# Patient Record
Sex: MALE
[De-identification: names, ages, dates, MRNs, and addresses within clinical notes are randomized per-mention and may not be internally consistent; named-entity substitution may affect disease eponyms.]

---

## 2018-05-09 ENCOUNTER — CHARTING TRANS (OUTPATIENT)
Dept: OTHER | Age: 23
End: 2018-05-09

## 2018-11-01 VITALS
HEIGHT: 63 IN | HEART RATE: 84 BPM | OXYGEN SATURATION: 97 % | SYSTOLIC BLOOD PRESSURE: 108 MMHG | RESPIRATION RATE: 16 BRPM | DIASTOLIC BLOOD PRESSURE: 70 MMHG | WEIGHT: 164.99 LBS | TEMPERATURE: 98.3 F | BODY MASS INDEX: 29.23 KG/M2

## 2021-04-27 ENCOUNTER — OFFICE VISIT (OUTPATIENT)
Dept: FAMILY MEDICINE CLINIC | Facility: CLINIC | Age: 26
End: 2021-04-27

## 2021-04-27 VITALS
HEIGHT: 63 IN | DIASTOLIC BLOOD PRESSURE: 88 MMHG | OXYGEN SATURATION: 98 % | HEART RATE: 80 BPM | BODY MASS INDEX: 31.18 KG/M2 | WEIGHT: 176 LBS | SYSTOLIC BLOOD PRESSURE: 120 MMHG

## 2021-04-27 DIAGNOSIS — S52.125A CLOSED NONDISPLACED FRACTURE OF HEAD OF LEFT RADIUS, INITIAL ENCOUNTER: Primary | ICD-10-CM

## 2021-04-27 PROCEDURE — 3079F DIAST BP 80-89 MM HG: CPT | Performed by: FAMILY MEDICINE

## 2021-04-27 PROCEDURE — 3008F BODY MASS INDEX DOCD: CPT | Performed by: FAMILY MEDICINE

## 2021-04-27 PROCEDURE — 3074F SYST BP LT 130 MM HG: CPT | Performed by: FAMILY MEDICINE

## 2021-04-27 PROCEDURE — 99203 OFFICE O/P NEW LOW 30 MIN: CPT | Performed by: FAMILY MEDICINE
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
## 2021-05-18 ENCOUNTER — HOSPITAL ENCOUNTER (OUTPATIENT)
Dept: GENERAL RADIOLOGY | Facility: HOSPITAL | Age: 26
Discharge: HOME | End: 2021-05-18
Attending: ORTHOPAEDIC SURGERY
Payer: COMMERCIAL

## 2021-05-18 ENCOUNTER — OFFICE VISIT (OUTPATIENT)
Dept: ORTHOPEDICS CLINIC | Facility: CLINIC | Age: 26
End: 2021-05-18

## 2021-05-18 VITALS — WEIGHT: 176 LBS | BODY MASS INDEX: 31.18 KG/M2 | HEIGHT: 63 IN

## 2021-05-18 DIAGNOSIS — S59.902A INJURY OF LEFT ELBOW, INITIAL ENCOUNTER: Primary | ICD-10-CM

## 2021-05-18 DIAGNOSIS — S52.132D CLOSED DISPLACED FRACTURE OF NECK OF LEFT RADIUS WITH ROUTINE HEALING, SUBSEQUENT ENCOUNTER: ICD-10-CM

## 2021-05-18 DIAGNOSIS — S59.902A INJURY OF LEFT ELBOW, INITIAL ENCOUNTER: ICD-10-CM

## 2021-05-18 PROCEDURE — 24650 CLTX RDL HEAD/NCK FX WO MNPJ: CPT | Performed by: ORTHOPAEDIC SURGERY

## 2021-05-18 PROCEDURE — 3008F BODY MASS INDEX DOCD: CPT | Performed by: ORTHOPAEDIC SURGERY

## 2021-05-18 PROCEDURE — 99244 OFF/OP CNSLTJ NEW/EST MOD 40: CPT | Performed by: ORTHOPAEDIC SURGERY

## 2021-05-18 PROCEDURE — 73080 X-RAY EXAM OF ELBOW: CPT | Performed by: ORTHOPAEDIC SURGERY
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
## 2021-05-27 ENCOUNTER — TELEPHONE (OUTPATIENT)
Dept: PHYSICAL THERAPY | Facility: HOSPITAL | Age: 26
End: 2021-05-27

## 2021-06-17 ENCOUNTER — OFFICE VISIT (OUTPATIENT)
Dept: PHYSICAL THERAPY | Facility: HOSPITAL | Age: 26
End: 2021-06-17
Attending: ORTHOPAEDIC SURGERY
Payer: COMMERCIAL

## 2021-06-17 DIAGNOSIS — S52.132D CLOSED DISPLACED FRACTURE OF NECK OF LEFT RADIUS WITH ROUTINE HEALING, SUBSEQUENT ENCOUNTER: ICD-10-CM

## 2021-06-17 PROCEDURE — 97110 THERAPEUTIC EXERCISES: CPT

## 2021-06-17 PROCEDURE — 97161 PT EVAL LOW COMPLEX 20 MIN: CPT

## 2021-06-17 NOTE — PROGRESS NOTES
SHOULDER EVALUATION:   Referring Physician: Dr. Ward [id number]  Diagnosis: Closed displaced fracture of neck of left radius with routine healing, subsequent encounter (G76.580R)     Date of Service: 6/17/2021     PATIENT SUMMARY   Kate Grigsby is a 22 yea L shoulder,  elbow, wrist, and hand with difficulty gripping.  The results of the objective tests and measures show pt exhibits postural deviations, as well as decreased ROM, flexibility, and strength of the L shoulder, elbow, forearm, and wrist.  Pt would Extension: R +5; L 0 end range strain   Pronation: R 80; L 80  Supination: R 75; L 70 end range strain    Flexion: R 60; L 50  Extension: R 55; L 50  Radial dev: R 30;  L 25  Ulnar dev: R 45; L 45       Strength/MMT:   Shoulder Elbow Wrist    Flexion: R 5 muscle grade for functional ADL of lifting, carrying, and doing chores. Pt will be Indep with HEP for symptom management and recovery of function. Frequency / Duration: Patient will be seen for 2 x/week or a total of 8-10 visits over a 90 day period.

## 2021-06-23 ENCOUNTER — OFFICE VISIT (OUTPATIENT)
Dept: PHYSICAL THERAPY | Facility: HOSPITAL | Age: 26
End: 2021-06-23
Attending: ORTHOPAEDIC SURGERY
Payer: COMMERCIAL

## 2021-06-23 DIAGNOSIS — S52.132D CLOSED DISPLACED FRACTURE OF NECK OF LEFT RADIUS WITH ROUTINE HEALING, SUBSEQUENT ENCOUNTER: ICD-10-CM

## 2021-06-23 PROCEDURE — 97140 MANUAL THERAPY 1/> REGIONS: CPT

## 2021-06-23 PROCEDURE — 97110 THERAPEUTIC EXERCISES: CPT

## 2021-06-23 NOTE — PROGRESS NOTES
Dx: Closed displaced fracture of neck of left radius with routine healing, subsequent encounter [employer] (Authorized # of Visits):   HMO: 12 by 8/24            Authorizing Physician: Dr. Satinder Cardenas MD visit: uncertain   Fall Risk: and doing chores. Pt will be Indep with HEP for symptom management and recovery of function.       Plan:  Plan to continue skilled PT to  to address jt restrictions, restore ROM, and progress with strengthening to achieve goals as outlined and to promote

## 2021-06-28 ENCOUNTER — OFFICE VISIT (OUTPATIENT)
Dept: PHYSICAL THERAPY | Facility: HOSPITAL | Age: 26
End: 2021-06-28
Attending: ORTHOPAEDIC SURGERY
Payer: COMMERCIAL

## 2021-06-28 DIAGNOSIS — S52.132D CLOSED DISPLACED FRACTURE OF NECK OF LEFT RADIUS WITH ROUTINE HEALING, SUBSEQUENT ENCOUNTER: ICD-10-CM

## 2021-06-28 PROCEDURE — 97110 THERAPEUTIC EXERCISES: CPT

## 2021-06-28 PROCEDURE — 97140 MANUAL THERAPY 1/> REGIONS: CPT

## 2021-06-28 NOTE — PROGRESS NOTES
Dx: Closed displaced fracture of neck of left radius with routine healing, subsequent encounter [employer] (Authorized # of Visits):   HMO: 12 by 8/24            Authorizing Physician: Dr. Coco Kaminski  Next MD visit: 7/6/21  Fall Risk: Francois Lyle carrying, and doing chores  Pt will improve forearm pronation by 5 deg for functional ADL  Pt will improve forearm supination by 5-10 deg for functional ADL.    Pt will be able to tolerate pressure on the arm for functional ADL   Pt will improve strength of HEP: HEP:            Charges: TEx2; MT x1;        Total Timed Treatment: 45 min  Total Treatment Time: 46 min

## 2021-06-29 ENCOUNTER — TELEPHONE (OUTPATIENT)
Dept: FAMILY MEDICINE CLINIC | Facility: CLINIC | Age: 26
End: 2021-06-29

## 2021-06-29 DIAGNOSIS — S52.125A CLOSED NONDISPLACED FRACTURE OF HEAD OF LEFT RADIUS, INITIAL ENCOUNTER: Primary | ICD-10-CM

## 2021-06-29 NOTE — TELEPHONE ENCOUNTER
Pt needs a new referral for Ortho because the office he had his consultation with, was only written for one visit.

## 2021-06-29 NOTE — TELEPHONE ENCOUNTER
Per pt, he has f/u appmnt with Dr. Bladimir Mariee scheduled for 7/6/21 but needs new referral. This RN generated new referral and contacted [address] [place] [address] in referrals.  Pt aware that referral needs to be authorized by insurance first. Pt verbalized understanding and agre

## 2021-07-01 ENCOUNTER — OFFICE VISIT (OUTPATIENT)
Dept: PHYSICAL THERAPY | Facility: HOSPITAL | Age: 26
End: 2021-07-01
Attending: ORTHOPAEDIC SURGERY
Payer: COMMERCIAL

## 2021-07-01 DIAGNOSIS — S52.132D CLOSED DISPLACED FRACTURE OF NECK OF LEFT RADIUS WITH ROUTINE HEALING, SUBSEQUENT ENCOUNTER: ICD-10-CM

## 2021-07-01 PROCEDURE — 97140 MANUAL THERAPY 1/> REGIONS: CPT

## 2021-07-01 PROCEDURE — 97110 THERAPEUTIC EXERCISES: CPT

## 2021-07-01 NOTE — PROGRESS NOTES
ProgressSummary  Pt has attended 4/10 visits in Physical Therapy. Dx: Closed displaced fracture of neck of left radius with routine healing, subsequent encounter [employer] (Authorized # of Visits):   HMO: 12 by 8/24            Authorizing bags.  MET  Pt will improve elbow flexion by 10-15 deg to promote functional ADL of lifting, carrying, and doing chores. Progressing  Pt will improve forearm pronation by 5 deg for functional ADL.  MET  Pt will improve forearm supination by 5-10 deg for fun flex/ext;       -forearm pro/supination dony with hammer with 1 wt/10x.  Repeat with 2 wts/12x;      Standing:   -bicep curls 3#/6x;   -bicep curls 3\2#/8x;      Supine:   -bicep curls with towel support: 2#/12x      -Supine elbow ext 10x no wt;  Repeat with

## 2021-07-06 ENCOUNTER — HOSPITAL ENCOUNTER (OUTPATIENT)
Dept: GENERAL RADIOLOGY | Facility: HOSPITAL | Age: 26
Discharge: HOME | End: 2021-07-06
Attending: ORTHOPAEDIC SURGERY
Payer: COMMERCIAL

## 2021-07-06 ENCOUNTER — OFFICE VISIT (OUTPATIENT)
Dept: ORTHOPEDICS CLINIC | Facility: CLINIC | Age: 26
End: 2021-07-06

## 2021-07-06 VITALS — HEIGHT: 63 IN | BODY MASS INDEX: 31.18 KG/M2 | WEIGHT: 176 LBS

## 2021-07-06 DIAGNOSIS — Z47.89 ORTHOPEDIC AFTERCARE: ICD-10-CM

## 2021-07-06 DIAGNOSIS — S52.132D CLOSED DISPLACED FRACTURE OF NECK OF LEFT RADIUS WITH ROUTINE HEALING, SUBSEQUENT ENCOUNTER: Primary | ICD-10-CM

## 2021-07-06 PROCEDURE — 3008F BODY MASS INDEX DOCD: CPT | Performed by: ORTHOPAEDIC SURGERY

## 2021-07-06 PROCEDURE — 99024 POSTOP FOLLOW-UP VISIT: CPT | Performed by: ORTHOPAEDIC SURGERY

## 2021-07-06 PROCEDURE — 73080 X-RAY EXAM OF ELBOW: CPT | Performed by: ORTHOPAEDIC SURGERY

## 2021-07-06 RX ORDER — IBUPROFEN 600 MG/1
TABLET ORAL
COMMUNITY
Start: 2021-04-29 | End: 2021-07-06 | Stop reason: ALTCHOICE

## 2021-07-06 NOTE — PROGRESS NOTES
NURSING INTAKE COMMENTS: Patient presents with:  Fracture: Left radial neck fx- pt states he hears/feels cracks/snaps with certain movements.       HPI: This 22year old male presents today months after left radial head fracture in which I assumed care a mo no swelling or atrophy. Musculoskeletal: Motion was almost completely symmetric to the right elbow. He had true full extension and flexion about 5 degrees short of the right elbow.   He had full pronation and just a few degrees short of full supination co

## 2021-07-07 ENCOUNTER — OFFICE VISIT (OUTPATIENT)
Dept: PHYSICAL THERAPY | Facility: HOSPITAL | Age: 26
End: 2021-07-07
Attending: ORTHOPAEDIC SURGERY
Payer: COMMERCIAL

## 2021-07-07 DIAGNOSIS — S52.132D CLOSED DISPLACED FRACTURE OF NECK OF LEFT RADIUS WITH ROUTINE HEALING, SUBSEQUENT ENCOUNTER: ICD-10-CM

## 2021-07-07 PROCEDURE — 97140 MANUAL THERAPY 1/> REGIONS: CPT

## 2021-07-07 PROCEDURE — 97110 THERAPEUTIC EXERCISES: CPT

## 2021-07-07 NOTE — PROGRESS NOTES
Dx: Closed displaced fracture of neck of left radius with routine healing, subsequent encounter [email or web address] (Authorized # of Visits):   HMO: 12 by 8/24            Authorizing Physician: Dr. Penelope Cardenas MD visit: 7/6/21  Fall Risk: standard pronation by 5 deg for functional ADL. MET  Pt will improve forearm supination by 5-10 deg for functional ADL. Progressing  Pt will be able to tolerate pressure on the arm for functional ADL.  Not tested  Pt will improve strength of the L elbow, forearm, a curls 3\2#/8x;      Supine:   -bicep curls with towel support: 2#/12x      -Supine elbow ext 10x no wt;  Repeat with 1#/10x.   MT: 20 min  Gentle jt mot gr2,3  -Distraction humero-ulnar jt  -Distraction radio-humeral jt  -Distal radio/ulnar jt palmar/dorsal

## 2021-07-12 ENCOUNTER — OFFICE VISIT (OUTPATIENT)
Dept: PHYSICAL THERAPY | Facility: HOSPITAL | Age: 26
End: 2021-07-12
Attending: ORTHOPAEDIC SURGERY
Payer: COMMERCIAL

## 2021-07-12 DIAGNOSIS — S52.132D CLOSED DISPLACED FRACTURE OF NECK OF LEFT RADIUS WITH ROUTINE HEALING, SUBSEQUENT ENCOUNTER: ICD-10-CM

## 2021-07-12 PROCEDURE — 97110 THERAPEUTIC EXERCISES: CPT

## 2021-07-12 PROCEDURE — 97140 MANUAL THERAPY 1/> REGIONS: CPT

## 2021-07-12 NOTE — PROGRESS NOTES
Dx: Closed displaced fracture of neck of left radius with routine healing, subsequent encounter [employer] (Authorized # of Visits):   HMO: 12 by 8/24            Authorizing Physician: Dr. Karlene Shi  Next MD visit: 7/6/21  Fall Risk: standard supination by 5-10 deg for functional ADL. Progressing  Pt will be able to tolerate pressure on the arm for functional ADL.  Progressing  Pt will improve strength of the L elbow, forearm, and wrist by 1/2 - 1 muscle grade for functional ADL of lifting, car support: 2#/12x      -Supine elbow ext 10x no wt;  Repeat with 1#/10x.   MT: 20 min  Gentle jt mot gr2,3  -Distraction humero-ulnar jt  -Distraction radio-humeral jt  -Distal radio/ulnar jt palmar/dorsal glides   -Pect major stretch: clavicular and sternal

## 2021-07-14 ENCOUNTER — OFFICE VISIT (OUTPATIENT)
Dept: PHYSICAL THERAPY | Facility: HOSPITAL | Age: 26
End: 2021-07-14
Attending: ORTHOPAEDIC SURGERY
Payer: COMMERCIAL

## 2021-07-14 DIAGNOSIS — S52.132D CLOSED DISPLACED FRACTURE OF NECK OF LEFT RADIUS WITH ROUTINE HEALING, SUBSEQUENT ENCOUNTER: ICD-10-CM

## 2021-07-14 PROCEDURE — 97110 THERAPEUTIC EXERCISES: CPT

## 2021-07-14 PROCEDURE — 97140 MANUAL THERAPY 1/> REGIONS: CPT

## 2021-07-14 NOTE — PROGRESS NOTES
Dx: Closed displaced fracture of neck of left radius with routine healing, subsequent encounter [employer] (Authorized # of Visits):   HMO: 12 by 8/24            Authorizing Physician: Dr. Penelope Cardenas MD visit: 7/6/21  Fall Risk: standard of lifting, carrying, and doing chores. Progressing  Pt will improve forearm pronation by 5 deg for functional ADL. MET  Pt will improve forearm supination by 5-10 deg for functional ADL.   Progressing  Pt will be able to tolerate pressure on the arm for fu -bicep curls 3\2#/8x;      Supine:   -bicep curls with towel support: 2#/12x      -Supine elbow ext 10x no wt;  Repeat with 1#/10x.   MT: 20 min  Gentle jt mot gr2,3  -Distraction humero-ulnar jt  -Distraction radio-humeral jt  -Distal radio/ulnar jt palm pronation/supination with 16 ounce hammer   -Supine elbow extension against gravity with no wt and then 1# wt if no pain.     HEP: HEP:  Continue as previous HEP:  [facility] with gradual progression with out pain  HEP:  Continue at health club

## 2021-07-19 ENCOUNTER — OFFICE VISIT (OUTPATIENT)
Dept: PHYSICAL THERAPY | Facility: HOSPITAL | Age: 26
End: 2021-07-19
Attending: ORTHOPAEDIC SURGERY
Payer: COMMERCIAL

## 2021-07-19 PROCEDURE — 97110 THERAPEUTIC EXERCISES: CPT

## 2021-07-19 PROCEDURE — 97140 MANUAL THERAPY 1/> REGIONS: CPT

## 2021-07-19 NOTE — PROGRESS NOTES
Dx: Closed displaced fracture of neck of left radius with routine healing, subsequent encounter [employer] (Authorized # of Visits):   HMO: 12 by 8/24            Authorizing Physician: Dr. Armando Cardenas MD visit: 7/6/21  Fall Risk: standard tolerate pressure on the arm for functional ADL. Progressing  Pt will improve strength of the L elbow, forearm, and wrist by 1/2 - 1 muscle grade for functional ADL of lifting, carrying, and doing chores.  Progressing  Pt will be Indep with HEP for symptom wts/12x2;      Standing:   -bicep curls 3#/12x2;    -Supine elbow ext with 2#/12x2     -Standing L  elbow ext with 2#/12x2     MT 20 min  STM L forearm, pronator teres,   MFR L forearm  Gentle jt mot gr2,3  -Distraction humero-ulnar jt  -Distraction radio-

## 2021-07-21 ENCOUNTER — OFFICE VISIT (OUTPATIENT)
Dept: PHYSICAL THERAPY | Facility: HOSPITAL | Age: 26
End: 2021-07-21
Attending: ORTHOPAEDIC SURGERY
Payer: COMMERCIAL

## 2021-07-21 PROCEDURE — 97140 MANUAL THERAPY 1/> REGIONS: CPT

## 2021-07-21 PROCEDURE — 97110 THERAPEUTIC EXERCISES: CPT

## 2021-07-21 NOTE — PROGRESS NOTES
Chavo  Pt has attended 9/12 visits in Physical Therapy. Dx: Closed displaced fracture of neck of left radius with routine healing, subsequent encounter [employer] (Authorized # of Visits):   HMO: 12 by 8/24            Lisa L=R;   Pt will be able to tolerate pressure on the arm for functional ADL. Progressing  Pt will improve strength of the L elbow, forearm, and wrist by 1/2 - 1 muscle grade for functional ADL of lifting, carrying, and doing chores.  Progressing  Pt will be I wts/12x    -forearm pro/supination  with hammer with 3 wts/12x2;      Standing:   -bicep curls 3#/12x2;    -Supine elbow ext with 2#/12x2     -Standing L  elbow ext with 2#/12x2     MT 20 min  STM L forearm, pronator teres,   MFR L forearm  Gentle jt mot g back to the wall: \"stick up\" ex for 30 sec moving arms  2-4\" for pect stretch              HEP: HEP:  Continue as previous HEP:  Continue at health club with gradual progression with out pain  HEP:  Continue at health club with gradual progression with